# Patient Record
Sex: FEMALE | URBAN - METROPOLITAN AREA
[De-identification: names, ages, dates, MRNs, and addresses within clinical notes are randomized per-mention and may not be internally consistent; named-entity substitution may affect disease eponyms.]

---

## 2019-01-10 ENCOUNTER — OFFICE VISIT (OUTPATIENT)
Dept: FAMILY MEDICINE | Facility: CLINIC | Age: 29
End: 2019-01-10

## 2019-01-10 VITALS
HEART RATE: 84 BPM | OXYGEN SATURATION: 99 % | TEMPERATURE: 99 F | BODY MASS INDEX: 25.36 KG/M2 | WEIGHT: 109.6 LBS | SYSTOLIC BLOOD PRESSURE: 104 MMHG | HEIGHT: 55 IN | DIASTOLIC BLOOD PRESSURE: 76 MMHG | RESPIRATION RATE: 18 BRPM

## 2019-01-10 DIAGNOSIS — G44.89 OTHER HEADACHE SYNDROME: Primary | ICD-10-CM

## 2019-01-10 RX ORDER — ACETAMINOPHEN 500 MG
TABLET ORAL
Qty: 30 TABLET | Refills: 0 | Status: SHIPPED | OUTPATIENT
Start: 2019-01-10

## 2019-01-10 ASSESSMENT — MIFFLIN-ST. JEOR: SCORE: 583.63

## 2019-01-10 NOTE — Clinical Note
I have completed my note, please review, add tie in statement and close encounter. Thanks! Hoyeon KimEmail: [email or web address] [phone]

## 2019-01-11 NOTE — PATIENT INSTRUCTIONS
Patient Visit Summary    Patient Name: Rober Briscoe  MRN: [id number]    Date of Visit: 1/10/2019    Principle Diagnosis: Ringing in ears    Physician's Recommendations/Instructions: May take Tylenol as needed for symptoms relief.     Lab Tests Performed: None    Follow Up/Results: Thank you for coming to [facility] tonight. Please return if your symptoms persist or worsen.     Referrals and Instructions: Feel free to follow up with free audiology and counseling services provided by the . Audiology night at [facility] is 2/4 if you would like further assessment.     Physician: Dr. Alea Alvarado, Patient Care Lead

## 2019-01-11 NOTE — NURSING NOTE
"Patient presents with chief complaint of \"ear buzzing\", mostly the right ear. Symptom started approximately six months ago and was intermittent until a recent phone call over the weekend. Since the phone call, patient states the buzzing has been constant and she experiences occasional fluttering in her ears. It is most noticeable at night and in the early morning when it is quiet. No pain with the buzzing, though she states that she thinks it has caused frontal headaches and difficulty sleeping. The patient is not taking any medication. PHQ-2 score of 2, patient spoke with . Infrequent use of caffeine and alcohol.   JANETTE Valerio Nurse Clinician.   "

## 2019-01-11 NOTE — PROGRESS NOTES
D: A 28 year old female patient came in with a chief complaint of ringing in the ear this was her first visit to the clinic.  A: Patient had a score of 2 on her PHQ-2. PHQ-9 was administered with a score of 5, although patient noted that some were circumstantial and resulting from the ringing in the ears. Patient noted that she had seen a counselor in the past for mental health needs, and it was very helpful, but she had to stop due to an inability to pay for it anymore.  P:  and [facility] are providing patient with free and reduced cost counseling services.      Kim Waller   Student    Peña Blackwell MSW, [facility] (Social Work Preceptor)

## 2019-01-11 NOTE — PROGRESS NOTES
"MEDICINE NOTE    SUBJECTIVE:  Rober \"Idania\" is a 28 y.o. female who presents to the clinic today for evaluation of \"ear buzzing.\" She has had a ringing and fullness in her right ear along with an occasional \"vibratory sensation in her eardrum\" for 4 days (since the evening of January 6th) after she was talking on speaker phone (the volume was not loud). The ringing has been constant since then and is worse when it is quiet during the night and in the morning, causing her to have some difficulty sleeping. The noise of turning on a fan at night helps her fall asleep. She reports that she has noticed ringing in her right ear intermittently for about 6 months, and this is the most recent episode. She reports headaches at the front of her head the morning after the onset of ear ringing (the morning of January 7th), which she suggests could be due to not sleeping as well (~5 hrs/night). She reports a history of occasional migraine headaches associated with light sensitivity that improve with rest and turning lights off. She had mild cold symptoms about 3 weeks ago that have since resolved. She denies hearing difficulty, ear pain, ear redness/swelling, dizziness, lightheadedness, trauma to her head, exposure to loud noises, swimming recently, and ear bleeding. She occasionally uses a Q-tip to clear her ears. She does not take any medications, is unsure about vaccination status, and denies recent travel. She has been unemployed since December and is not currently insured. She would like to figure out what has been causing the ear ringing and headaches, as the symptoms have been causing her stress.    REVIEW OF SYSTEMS:  Gen: no fevers, chills  GI: no nausea, vomiting, abdominal pain  Musculoskeletal: no joint or muscle pain      No past medical history on file.    No past surgical history on file.    No family history on file.    Social History     Socioeconomic History     Marital status: Single     Spouse name: Not on " "file     Number of children: Not on file     Years of education: Not on file     Highest education level: Not on file   Social Needs     Financial resource strain: Not on file     Food insecurity - worry: Not on file     Food insecurity - inability: Not on file     Transportation needs - medical: Not on file     Transportation needs - non-medical: Not on file   Occupational History     Not on file   Tobacco Use     Smoking status: Not on file   Substance and Sexual Activity     Alcohol use: Not on file     Drug use: Not on file     Sexual activity: Not on file   Other Topics Concern     Not on file   Social History Narrative    1/10/19 - Pt requested information about [facility], PCP, rx assistance and low cost mental health resources. We provided information to the pt and she had no further questions. We also provided hearing referral information to the social workers to provide to pt JM       OBJECTIVE:  Physical Exam:  /76 (BP Location: Left arm, Patient Position: Sitting)   Pulse 84   Temp 99  F (37.2  C) (Oral)   Resp 18   Ht 0.62 m (2' 0.41\")   Wt 49.7 kg (109 lb 9.6 oz)   SpO2 99%   .33 kg/m    Constitutional: no distress, comfortable, pleasant   Ears: tympanic membranes clear bilaterally.    ASSESSMENT/PLAN:  Diagnoses and all orders for this visit:    Other headache syndrome  -     acetaminophen (TYLENOL) 500 MG tablet; Take 1 tablet by mouth every 6 hours as needed for headache    Idania is a 28 y.o. female who presents to the clinic today with 4 days of right ear ringing and frontal headaches without hearing loss or pain. Patient denies recent head trauma and does not take any medications. On physical exam, ears were clear bilaterally, making cerumen impaction and infection less likely, and patient was advised to return to [facility] on February 4th for Audiology Night for further evaluation. She does not have insurance currently, and other free services were provided to the patient for " audiology services. Patient was prescribed Tylenol to help with the headaches.     Patient was screened for kidney disease using the [facility] Kidney Screening questionnaire. She tested negative.    Med Clinician: Fletcher Lim, MS2  Preceptor: Dr. Alea Perez    In supervising the medical student, I repeated the exam documented above.  I have reviewed and verified the student s documentation.  Supervising Provider: Dr. Alea Perez

## 2019-01-12 NOTE — PROGRESS NOTES
"[facility] Progress Note    Chief complaint: ringing in ears    Last date of full med (Med Refill only):    Subjective:  Condition 1: Ringing in right ear  - AO (preferred name is Idania) is a 28-years-old female. She visits the clinic today for the evaluation of ringing in her right ear. She has been having ringing and vibration in her right ear since Sunday evening (1/6/19) after she was talking on a speaker phone. She states the volume was not loud. The ringing in her ear is constant and is worse at night and in the morning when it is quite. The ringing is causing her to have difficulty sleeping (only sleeping about 5 hours per day), so she tries to turn on a fan at night because the noise of the fan helps her fall asleep. The ringing also caused headache. She states that she has had vibration in her ears occasionally for about 6 months, but this is the first time having ringing in her ear. She denies hearing difficulty, exposure to loud noises, history of ear infection, swelling/redness in ears, dizziness, lightheadedness, swimming recently, ear bleeding, and trauma to her head. She sometimes uses a cotton swab to clear her ears. She states she had a mild cold about 3 weeks ago, but it had been resolved without taking any medications. She is not currently taking any medications. She reports that she occassionally has migraines with light sensitivity. She has been unemployed since December.     Social history:  Caffeine: drinks pop 1-2 times/month  Tobacco: denies tobacco use  Alcohol: drinks alcohol 1-2 times/month  Illicit drugs: denies illicit drug use      Current Outpatient Medications   Medication Sig Dispense Refill     acetaminophen (TYLENOL) 500 MG tablet Take 1 tablet by mouth every 6 hours as needed for headache 30 tablet 0         Objective:   /76 (BP Location: Left arm, Patient Position: Sitting)   Pulse 84   Temp 99  F (37.2  C) (Oral)   Resp 18   Ht 0.62 m (2' 0.41\")   Wt 49.7 kg (109 lb " 9.6 oz)   SpO2 99%   .33 kg/m      - Physical exam was done and both ears were clear.     Assessment:     Condition 1- Ringing in right ear with headache  DTP: Needs additional drug therapy to treat headache associated with ringing in right ear  Rationale: Physical exam was done and both ears were clear, which made ear infection less likely. Due to lack of equipments to examine, it would be better to advise the patient to return to [facility] on 2/4 for Audiology Night. The patient has headache due to ringing in her right ear. Tylenol (Acetaminophen) is a good medication option to relieve headache.       Plan:  1. Initiate Tylenol 500 mg, take 1 tablet by mouth every 6 hours as needed for headache.  2. Advise the patient to return to [facility] on 2/4 for Audiology Night.     Pharmacy Follow-Up Plan (Method, Date, Parameters):  - Follow-up in person on 2/4 (Audiology Night)  - Evaluate if Tylenol has been effective to relieve headache and assess whether patient experiences any side effects of nausea, vomiting, and stomach pain.       PharmCare Clinician: Hoyeon Kim   Pharmacy Preceptor: Kiarra Mora Pharm. D.    _____________________________  Preceptor Use Only:  In supervising the student, I have reviewed and verified the student's documentation and found it to be correct and complete.   Preceptor Signature: Kiarra Mora, PharmD, BCACP

## 2019-02-04 ENCOUNTER — APPOINTMENT (OUTPATIENT)
Dept: FAMILY MEDICINE | Facility: CLINIC | Age: 29
End: 2019-02-04